# Patient Record
Sex: MALE | Race: BLACK OR AFRICAN AMERICAN | NOT HISPANIC OR LATINO | URBAN - METROPOLITAN AREA
[De-identification: names, ages, dates, MRNs, and addresses within clinical notes are randomized per-mention and may not be internally consistent; named-entity substitution may affect disease eponyms.]

---

## 2017-02-14 ENCOUNTER — OUTPATIENT (OUTPATIENT)
Dept: OUTPATIENT SERVICES | Facility: HOSPITAL | Age: 32
LOS: 1 days | Discharge: HOME | End: 2017-02-14

## 2017-06-27 DIAGNOSIS — K02.53 DENTAL CARIES ON PIT AND FISSURE SURFACE PENETRATING INTO PULP: ICD-10-CM

## 2017-09-17 ENCOUNTER — EMERGENCY (EMERGENCY)
Facility: HOSPITAL | Age: 32
LOS: 0 days | Discharge: HOME | End: 2017-09-17

## 2017-09-17 DIAGNOSIS — Z79.899 OTHER LONG TERM (CURRENT) DRUG THERAPY: ICD-10-CM

## 2017-09-17 DIAGNOSIS — M54.2 CERVICALGIA: ICD-10-CM
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
## 2017-09-17 DIAGNOSIS — S16.1XXA STRAIN OF MUSCLE, FASCIA AND TENDON AT NECK LEVEL, INITIAL ENCOUNTER: ICD-10-CM
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
## 2017-09-17 DIAGNOSIS — Z91.013 ALLERGY TO SEAFOOD: ICD-10-CM

## 2018-03-27 ENCOUNTER — EMERGENCY (EMERGENCY)
Facility: HOSPITAL | Age: 33
LOS: 0 days | Discharge: HOME | End: 2018-03-27

## 2018-03-27 VITALS
SYSTOLIC BLOOD PRESSURE: 115 MMHG | HEART RATE: 89 BPM | DIASTOLIC BLOOD PRESSURE: 69 MMHG | OXYGEN SATURATION: 97 % | RESPIRATION RATE: 16 BRPM | WEIGHT: 198.42 LBS | TEMPERATURE: 97 F

## 2018-03-27 VITALS — WEIGHT: 199.96 LBS

## 2018-03-27 DIAGNOSIS — H92.02 OTALGIA, LEFT EAR: ICD-10-CM

## 2018-03-27 DIAGNOSIS — H61.22 IMPACTED CERUMEN, LEFT EAR: ICD-10-CM

## 2018-03-27 NOTE — ED PROVIDER NOTE - PHYSICAL EXAMINATION
CONST: Well appearing in NAD  EYES: PERRL, EOMI, Sclera and conjunctiva clear. Vision 20/20  ENT: No nasal discharge. Cerumen impaction in L ear, small amount removed. Unable to visualize TM. Right TM clear. Oropharynx normal appearing, no erythema or exudates.   CARD: Normal S1 S2; Normal rate and rhythm  RESP: Equal BS B/L, No wheezes, rhonchi or rales. No distress

## 2018-03-27 NOTE — ED PROVIDER NOTE - NS ED ROS FT
CONST: No fever, chills or bodyaches  EYES: No pain, redness, drainage or visual changes.  ENT: +Decreased hearing in L ear; No ear pain or discharge, nasal discharge or congestion. No sore throat  CARD: No chest pain, palpitations  RESP: No SOB, cough, hemoptysis. No hx of asthma or COPD

## 2018-03-27 NOTE — ED PROCEDURE NOTE - GENERAL PROCEDURE DETAILS
Normal saline injected through 18 gauge tubing with little removal. Used small scoop to remove cerumen. Still unable to removal all cerumen.

## 2018-03-27 NOTE — ED PROVIDER NOTE - OBJECTIVE STATEMENT
Pt is a 31 yo M c/o muffled earing in L ear x 1 week. Pt denies pain in the ear, no sore throat, fever or chills. No injury to the ear.

## 2018-11-11 ENCOUNTER — EMERGENCY (EMERGENCY)
Facility: HOSPITAL | Age: 33
LOS: 0 days | Discharge: HOME | End: 2018-11-11
Attending: EMERGENCY MEDICINE | Admitting: EMERGENCY MEDICINE

## 2018-11-11 VITALS
SYSTOLIC BLOOD PRESSURE: 125 MMHG | HEART RATE: 83 BPM | DIASTOLIC BLOOD PRESSURE: 62 MMHG | OXYGEN SATURATION: 98 % | HEIGHT: 72 IN | WEIGHT: 205.91 LBS | TEMPERATURE: 98 F | RESPIRATION RATE: 20 BRPM

## 2018-11-11 DIAGNOSIS — R50.9 FEVER, UNSPECIFIED: ICD-10-CM

## 2018-11-11 DIAGNOSIS — B97.89 OTHER VIRAL AGENTS AS THE CAUSE OF DISEASES CLASSIFIED ELSEWHERE: ICD-10-CM

## 2018-11-11 DIAGNOSIS — J06.9 ACUTE UPPER RESPIRATORY INFECTION, UNSPECIFIED: ICD-10-CM

## 2018-11-11 RX ORDER — PSEUDOEPHEDRINE HCL 30 MG
1 TABLET ORAL
Qty: 16 | Refills: 0 | OUTPATIENT
Start: 2018-11-11 | End: 2018-11-14

## 2018-11-11 RX ORDER — DEXAMETHASONE 0.5 MG/5ML
10 ELIXIR ORAL ONCE
Qty: 0 | Refills: 0 | Status: COMPLETED | OUTPATIENT
Start: 2018-11-11 | End: 2018-11-11

## 2018-11-11 RX ADMIN — Medication 10 MILLIGRAM(S): at 04:42

## 2018-11-11 NOTE — ED PROVIDER NOTE - PROGRESS NOTE DETAILS
Patient seen and evaluated by me. Patient does not meet CENTOR criteria for strep pharyngitis. Seems based on H+P that this is more viral URI. Patient will be prescribed decongestant and anti-tussive medications and will follow up with PMD. Will give 10mg decadron IM as well for symptomatic relief prior to discharge.

## 2018-11-11 NOTE — ED PROVIDER NOTE - MEDICAL DECISION MAKING DETAILS
Patient presented with 3 days of URI symptoms. Otherwise well appearing, tolerates PO, ambulatory, HD stable. Gave 10mg decadron IM for symptomatic relief in ED. Patient to be prescribed decongestant and anti-tussive medication for symptomatic relief as well. Agrees to follow up with PMD.

## 2019-04-09 ENCOUNTER — EMERGENCY (EMERGENCY)
Facility: HOSPITAL | Age: 34
LOS: 0 days | Discharge: HOME | End: 2019-04-09
Admitting: PHYSICIAN ASSISTANT
Payer: MEDICAID

## 2019-04-09 VITALS
TEMPERATURE: 97 F | DIASTOLIC BLOOD PRESSURE: 58 MMHG | OXYGEN SATURATION: 98 % | RESPIRATION RATE: 16 BRPM | HEART RATE: 72 BPM | SYSTOLIC BLOOD PRESSURE: 95 MMHG

## 2019-04-09 VITALS — DIASTOLIC BLOOD PRESSURE: 69 MMHG | SYSTOLIC BLOOD PRESSURE: 103 MMHG

## 2019-04-09 DIAGNOSIS — F17.200 NICOTINE DEPENDENCE, UNSPECIFIED, UNCOMPLICATED: ICD-10-CM

## 2019-04-09 DIAGNOSIS — M54.9 DORSALGIA, UNSPECIFIED: ICD-10-CM

## 2019-04-09 DIAGNOSIS — M54.6 PAIN IN THORACIC SPINE: ICD-10-CM

## 2019-04-09 DIAGNOSIS — Z79.899 OTHER LONG TERM (CURRENT) DRUG THERAPY: ICD-10-CM

## 2019-04-09 DIAGNOSIS — M54.2 CERVICALGIA: ICD-10-CM

## 2019-04-09 DIAGNOSIS — Z91.013 ALLERGY TO SEAFOOD: ICD-10-CM

## 2019-04-09 LAB — D DIMER BLD IA.RAPID-MCNC: 128 NG/ML DDU — SIGNIFICANT CHANGE UP (ref 0–230)

## 2019-04-09 PROCEDURE — 99284 EMERGENCY DEPT VISIT MOD MDM: CPT

## 2019-04-09 PROCEDURE — 71046 X-RAY EXAM CHEST 2 VIEWS: CPT | Mod: 26

## 2019-04-09 RX ORDER — IBUPROFEN 200 MG
1 TABLET ORAL
Qty: 15 | Refills: 0 | OUTPATIENT
Start: 2019-04-09 | End: 2019-04-13

## 2019-04-09 RX ORDER — TIZANIDINE 4 MG/1
1 TABLET ORAL
Qty: 15 | Refills: 0 | OUTPATIENT
Start: 2019-04-09 | End: 2019-04-13

## 2019-04-09 RX ORDER — KETOROLAC TROMETHAMINE 30 MG/ML
30 SYRINGE (ML) INJECTION ONCE
Qty: 0 | Refills: 0 | Status: DISCONTINUED | OUTPATIENT
Start: 2019-04-09 | End: 2019-04-09

## 2019-04-09 RX ORDER — METHOCARBAMOL 500 MG/1
1000 TABLET, FILM COATED ORAL ONCE
Qty: 0 | Refills: 0 | Status: COMPLETED | OUTPATIENT
Start: 2019-04-09 | End: 2019-04-09

## 2019-04-09 RX ADMIN — Medication 30 MILLIGRAM(S): at 16:12

## 2019-04-09 RX ADMIN — METHOCARBAMOL 1000 MILLIGRAM(S): 500 TABLET, FILM COATED ORAL at 16:12

## 2019-04-09 NOTE — ED PROVIDER NOTE - CLINICAL SUMMARY MEDICAL DECISION MAKING FREE TEXT BOX
Pt with right back pain. Worse on inspiration and ROM of arm and back. CXR neg, Ddimer neg. Pt feels better after meds. Will dc home on NSAIDS and MRs. FU with med clinic.  I have discussed the discharge plan with the patient. The patient agrees with the plan, as discussed.  The patient understands Emergency Department diagnosis is a preliminary diagnosis often based on limited information and that the patient must adhere to the follow-up plan as discussed.  The patient understands that if the symptoms worsen or if prescribed medications do not have the desired/planned effect that the patient may return to the Emergency Department at any time for further evaluation and treatment.

## 2019-04-09 NOTE — ED PROVIDER NOTE - NS ED ROS FT
CONST: No fever, chills or bodyaches  EYES: No pain, redness, drainage or visual changes.  ENT: No ear pain or discharge, nasal discharge or congestion. No sore throat  CARD: No chest pain, palpitations  RESP: No SOB, cough, hemoptysis. No hx of asthma or COPD  GI: No abdominal pain, N/V/D  MS: No joint pain or extremity pain/injury  SKIN: No rashes  NEURO: No headache, dizziness, paresthesias or LOC

## 2019-04-09 NOTE — ED PROVIDER NOTE - OBJECTIVE STATEMENT
Pt has constant right mid back pain since yesterday. Pain is dull and worse with ROM and deep breaths. DEnies cough, fever, chills, SOB. Pt smokes. Denies calf pain or swelling.

## 2019-04-09 NOTE — ED PROVIDER NOTE - NSFOLLOWUPCLINICS_GEN_ALL_ED_FT
[facility]  Medicine  [address], [place]   Phone: [phone]  Fax:   Follow Up Time: 4-6 Days

## 2019-04-09 NOTE — ED PROVIDER NOTE - PHYSICAL EXAMINATION
CONST: Well appearing in NAD  EYES: PERRL, EOMI, Sclera and conjunctiva clear.   NECK: Non-tender, no meningeal signs  CARD: Normal S1 S2; Normal rate and rhythm  RESP: Equal BS B/L, No wheezes, rhonchi or rales. No distress  GI: Soft, non-tender, non-distended.  MS: Normal ROM in all extremities. No midline spinal tenderness. There is spasm and tenderness to the trapezius muscle of right back. No calf swelling or tenderness  SKIN: Warm, dry, no acute rashes. Good turgor  NEURO: A&Ox3, No focal deficits. Strength 5/5 with no sensory deficits. Steady gait

## 2019-07-10 NOTE — ED ADULT TRIAGE NOTE - MEANS OF ARRIVAL

## 2019-08-06 ENCOUNTER — EMERGENCY (EMERGENCY)
Facility: HOSPITAL | Age: 34
LOS: 0 days | Discharge: HOME | End: 2019-08-06
Attending: EMERGENCY MEDICINE | Admitting: EMERGENCY MEDICINE
Payer: MEDICAID

## 2019-08-06 VITALS
DIASTOLIC BLOOD PRESSURE: 59 MMHG | SYSTOLIC BLOOD PRESSURE: 106 MMHG | WEIGHT: 192.9 LBS | OXYGEN SATURATION: 99 % | HEART RATE: 63 BPM | TEMPERATURE: 96 F | RESPIRATION RATE: 18 BRPM

## 2019-08-06 DIAGNOSIS — F17.200 NICOTINE DEPENDENCE, UNSPECIFIED, UNCOMPLICATED: ICD-10-CM

## 2019-08-06 DIAGNOSIS — Z91.013 ALLERGY TO SEAFOOD: ICD-10-CM

## 2019-08-06 DIAGNOSIS — M54.9 DORSALGIA, UNSPECIFIED: ICD-10-CM
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
## 2019-08-06 DIAGNOSIS — Z79.899 OTHER LONG TERM (CURRENT) DRUG THERAPY: ICD-10-CM

## 2019-08-06 DIAGNOSIS — M54.5 LOW BACK PAIN: ICD-10-CM

## 2019-08-06 PROCEDURE — 99282 EMERGENCY DEPT VISIT SF MDM: CPT

## 2019-08-06 NOTE — ED ADULT NURSE NOTE - CHIEF COMPLAINT QUOTE
"I hurt my back on Friday after a slip and fall, I'm still having back pain". pt was seen at [facility] after fall on Friday

## 2019-08-06 NOTE — ED PROVIDER NOTE - PHYSICAL EXAMINATION
CONSTITUTIONAL: Well-developed; well-nourished; in no acute distress.   SKIN: warm, dry.  CARD: S1, S2 normal; no murmurs, gallops, or rubs. Regular rate and rhythm.   RESP: No wheezes, rales or rhonchi.  EXT: Normal ROM.  No clubbing, cyanosis or edema. Tenderness to palpation of the B/L lower lumbar region.  LYMPH: No acute cervical adenopathy.  NEURO: Alert, oriented. Normal gait. Sensation grossly intact.  PSYCH: Cooperative, appropriate.

## 2019-08-06 NOTE — ED PROVIDER NOTE - CLINICAL SUMMARY MEDICAL DECISION MAKING FREE TEXT BOX
Back pain- no midline tenderness, no foal neuro deficit elicited on exam. - declines pain control, ambulatory, ortho/rehab follow up.

## 2019-08-06 NOTE — ED ADULT NURSE NOTE - CHPI ED NUR SYMPTOMS NEG
no difficulty bearing weight/no motor function loss/no numbness/no bladder dysfunction/no anorexia/no constipation/no neck tenderness/no tingling/no fatigue/no bowel dysfunction

## 2019-08-06 NOTE — ED ADULT NURSE NOTE - NSIMPLEMENTINTERV_GEN_ALL_ED
Implemented All Universal Safety Interventions:  [place] to call system. Call bell, personal items and telephone within reach. Instruct patient to call for assistance. Room bathroom lighting operational. Non-slip footwear when patient is off stretcher. Physically safe environment: no spills, clutter or unnecessary equipment. Stretcher in lowest position, wheels locked, appropriate side rails in place.

## 2019-08-06 NOTE — ED PROVIDER NOTE - NSFOLLOWUPCLINICS_GEN_ALL_ED_FT
[facility]  Medicine  [address], [place]   Phone: [phone]  Fax:   Follow Up Time: 1-3 Days

## 2019-08-06 NOTE — ED ADULT TRIAGE NOTE - CHIEF COMPLAINT QUOTE
"I hurt my back on Friday after a slip and fall, I'm still having back pain". pt was seen at [facility] after fall on Friday

## 2019-08-06 NOTE — ED PROVIDER NOTE - OBJECTIVE STATEMENT
33y M w/ no sig PMH presents with back pain for 4 days. States originally slipped on water and fell backwards on back. Was evaluated in ED and was discharged on muscle relaxants. States gets good relief with muscle relaxant but returns today as pain has persisted. Does not want to be reliant on any medications. Has outpatient follow up wit his primary MD tomorrow but is coming in today to rule out any emergent conditions. Denies fever, chills, n/v/d, abd pain, urinary/bowel incontinence, or numbness/tingling.

## 2021-07-18 ENCOUNTER — TRANSCRIPTION ENCOUNTER (OUTPATIENT)
Age: 36
End: 2021-07-18

## 2023-11-27 ENCOUNTER — EMERGENCY (EMERGENCY)
Facility: HOSPITAL | Age: 38
LOS: 0 days | Discharge: HOME | End: 2023-11-27
Attending: EMERGENCY MEDICINE
Payer: SELF-PAY

## 2023-11-27 VITALS
TEMPERATURE: 98 F | WEIGHT: 190.04 LBS | SYSTOLIC BLOOD PRESSURE: 107 MMHG | OXYGEN SATURATION: 99 % | HEART RATE: 69 BPM | RESPIRATION RATE: 18 BRPM | DIASTOLIC BLOOD PRESSURE: 61 MMHG

## 2023-11-27 DIAGNOSIS — Z91.013 ALLERGY TO SEAFOOD: ICD-10-CM

## 2023-11-27 DIAGNOSIS — B02.9 ZOSTER WITHOUT COMPLICATIONS: ICD-10-CM

## 2023-11-27 DIAGNOSIS — R21 RASH AND OTHER NONSPECIFIC SKIN ERUPTION: ICD-10-CM

## 2023-11-27 DIAGNOSIS — Z86.19 PERSONAL HISTORY OF OTHER INFECTIOUS AND PARASITIC DISEASES: ICD-10-CM

## 2023-11-27 PROCEDURE — 99284 EMERGENCY DEPT VISIT MOD MDM: CPT

## 2023-11-27 PROCEDURE — 99283 EMERGENCY DEPT VISIT LOW MDM: CPT

## 2023-11-27 RX ORDER — VALACYCLOVIR 500 MG/1
1 TABLET, FILM COATED ORAL
Qty: 21 | Refills: 0
Start: 2023-11-27 | End: 2023-12-03

## 2023-11-27 NOTE — ED PROVIDER NOTE - PATIENT PORTAL LINK FT
You can access the FollowMyHealth Patient Portal offered by [facility] by registering at the following website: [email or web address]. By joining [facility]’s FollowMyHealth portal, you will also be able to view your health information using other applications (apps) compatible with our system.

## 2023-11-27 NOTE — ED PROVIDER NOTE - CLINICAL SUMMARY MEDICAL DECISION MAKING FREE TEXT BOX
Patient presents with rash x 2 day. On exam noted to have vesicular lesions concerning for shingles rash. antiviral and steroids given. Discharged with pmd and derm follow up. return precautions discussed.

## 2023-11-27 NOTE — ED PROVIDER NOTE - NSFOLLOWUPINSTRUCTIONS_ED_ALL_ED_FT
Please follow-up with PCP in 1 to 3 days. Please take meds as prescribed. Return precautions explained in full to patient/family.    Our Emergency Department Referral Coordinators will be reaching out to you in the next 24-48 hours from 9:00am to 5:00pm with a follow up appointment. Please expect a phone call from the hospital in that time frame. If you do not receive a call or if you have any questions or concerns, you can reach them at   [phone].    Shingles  A rash on the skin.  Shingles, which is also known as herpes zoster, is an infection that causes a painful skin rash and fluid-filled blisters. It is caused by a virus.    Shingles only develops in people who:  Have had chickenpox.  Have been vaccinated against chickenpox. Shingles is rare in this group.  What are the causes?  Shingles is caused by varicella-zoster virus. This is the same virus that causes chickenpox. After a person is exposed to the virus, it stays in the body in an inactive (dormant) state. Shingles develops if the virus is reactivated. This can happen many years after the first (initial) exposure to the virus. It is not known what causes this virus to be reactivated.    What increases the risk?  People who have had chickenpox or received the chickenpox vaccine are at risk for shingles. Shingles infection is more common in people who:  Are older than 60 years of age.  Have a weakened disease-fighting system (immune system), such as people with:  HIV (human immunodeficiency virus).  AIDS (acquired immunodeficiency syndrome).  Cancer.  Are taking medicines that weaken the immune system, such as organ transplant medicines.  Are experiencing a lot of stress.  What are the signs or symptoms?  Early symptoms of this condition include itching, tingling, and pain in an area on your skin. Pain may be described as burning, stabbing, or throbbing.    A few days or weeks after early symptoms start, a painful red rash appears. The rash is usually on one side of the body and has a band-like or belt-like pattern. The rash eventually turns into fluid-filled blisters that break open, change into scabs, and dry up in about 2–3 weeks.    At any time during the infection, you may also develop:  A fever.  Chills.  A headache.  Nausea.  How is this diagnosed?  This condition is diagnosed with a skin exam. Skin or fluid samples (a culture) may be taken from the blisters before a diagnosis is made.    How is this treated?  The rash may last for several weeks. There is not a specific cure for this condition. Your health care provider may prescribe medicines to help you manage pain, recover more quickly, and avoid long-term problems. Medicines may include:  Antiviral medicines.  Anti-inflammatory medicines.  Pain medicines.  Anti-itching medicines (antihistamines).  If the area involved is on your face, you may be referred to a specialist, such as an eye doctor (ophthalmologist) or an ear, nose, and throat (ENT) doctor (otorhinolaryngologist) to help you avoid eye problems, chronic pain, or disability.    Follow these instructions at home:  Medicines    Take over-the-counter and prescription medicines only as told by your health care provider.  Apply an anti-itch cream or numbing cream to the affected area as told by your health care provider.  Relieving itching and discomfort    A bathtub filled with water.  Apply cold, wet cloths (cold compresses) to the area of the rash or blisters as told by your health care provider.  Cool baths can be soothing. Try adding baking soda or dry oatmeal to the water to reduce itching. Do not bathe in hot water.  Use calamine lotion as recommended by your health care provider. This is an over-the-counter lotion that helps to relieve itchiness.  Blister and rash care    Keep your rash covered with a loose bandage (dressing). Wear loose-fitting clothing to help ease the pain of material rubbing against the rash.  Wash your hands with soap and water for at least 20 seconds before and after you change your dressing. If soap and water are not available, use hand .  Change your dressing as told by your health care provider.  Keep your rash and blisters clean by washing the area with mild soap and cool water as told by your health care provider.  Check your rash every day for signs of infection. Check for:  More redness, swelling, or pain.  Fluid or blood.  Warmth.  Pus or a bad smell.  Do not scratch your rash or pick at your blisters. To help avoid scratching:  Keep your fingernails clean and cut short.  Wear gloves or mittens while you sleep, if scratching is a problem.  General instructions    Rest as told by your health care provider.  Wash your hands often with soap and water for at least 20 seconds. If soap and water are not available, use hand . Doing this lowers your chance of getting a bacterial skin infection.  Before your blisters change into scabs, your shingles infection can cause chickenpox in people who have never had it or have never been vaccinated against it. To prevent this from happening, avoid contact with other people, especially:  Babies.  Pregnant women.  Children who have eczema.  Older people who have transplants.  People who have chronic illnesses, such as cancer or AIDS.  Keep all follow-up visits. This is important.  How is this prevented?  Getting vaccinated is the best way to prevent shingles and protect against shingles complications. If you have not been vaccinated, talk with your health care provider about getting the vaccine.    Where to find more information  Centers for Disease Control and Prevention: www.cdc.gov  Contact a health care provider if:  Your pain is not relieved with prescribed medicines.  Your pain does not get better after the rash heals.  You have any of these signs of infection:  More redness, swelling, or pain around the rash.  Fluid or blood coming from the rash.  Warmth coming from your rash.  Pus or a bad smell coming from the rash.  A fever.  Get help right away if:  The rash is on your face or nose.  You have facial pain, pain around your eye area, or loss of feeling on one side of your face.  You have difficulty seeing.  You have ear pain or have ringing in your ear.  You have a loss of taste.  Your condition gets worse.  Summary  Shingles, also known as herpes zoster, is an infection that causes a painful skin rash and fluid-filled blisters.  This condition is diagnosed with a skin exam. Skin or fluid samples (a culture) may be taken from the blisters.  Keep your rash covered with a loose bandage (dressing). Wear loose-fitting clothing to help ease the pain of material rubbing against the rash.  Before your blisters change into scabs, your shingles infection can cause chickenpox in people who have never had it or have never been vaccinated against it.  This information is not intended to replace advice given to you by your health care provider. Make sure you discuss any questions you have with your health care provider.

## 2023-11-27 NOTE — ED PROVIDER NOTE - PHYSICAL EXAMINATION
CONSTITUTIONAL: NAD  SKIN: Grouped vesicles, coalescing, about 2 cm in diameter noted on left buttock.  No surrounding fluctuance or erythema appreciated.  Mildly tender.  HEAD: NCAT  EYES: NL inspection  NEURO: Grossly unremarkable  PSYCH: Cooperative, appropriate.

## 2023-11-27 NOTE — ED PROVIDER NOTE - ATTENDING CONTRIBUTION TO CARE
37 yo M no pmh presents with rash to back of left leg. States that few days ago noted some pain to that area then yesterday looked and noted a rash. no fevers or recent illness. patient states that he had chicken pox twice as a child and reports one previous episode of shingles a few years ago.     CONSTITUTIONAL: Well-developed; well-nourished; in no acute distress.   SKIN: warm, dry. vesicular rash with erythematous base to posterior left thigh. no drainage.   HEAD: Normocephalic; atraumatic.  EXT: Normal ROM.  5/5 strength in all 4 extremities   LYMPH: No acute cervical adenopathy.  NEURO: Alert, oriented, grossly unremarkable. neurovascularly intact

## 2023-11-27 NOTE — ED ADULT NURSE NOTE - NSFALLUNIVINTERV_ED_ALL_ED
Bed/Stretcher in lowest position, wheels locked, appropriate side rails in place/Call bell, personal items and telephone in reach/Instruct patient to call for assistance before getting out of bed/chair/stretcher/Non-slip footwear applied when patient is off stretcher/[place] to call system/Physically safe environment - no spills, clutter or unnecessary equipment/Purposeful proactive rounding/Room/bathroom lighting operational, light cord in reach

## 2023-11-27 NOTE — ED PROVIDER NOTE - OBJECTIVE STATEMENT
38-year-old male no notable past medical history coming with complaints of rash.  Patient reports that 5 days ago he felt some pain on his left buttocks, felt similar to a sensation of an ingrowing hair.  He ignored it until he noticed yesterday a small rash on that area.  Mostly painful.  Denies any concerning sexual history, monogamous with 1 person.  Denies any other associated symptoms at this time.  Notes that he had chickenpox as a child, unknown vaccination. 38-year-old male no notable past medical history coming with complaints of rash.  Patient reports that 5 days ago he felt some pain on his left buttocks, felt similar to a sensation of an ingrowing hair.  He ignored it until he noticed yesterday a small rash on that area.  Mostly painful.  Denies any concerning sexual history, monogamous with 1 person.  Denies any other associated symptoms at this time.  Notes that he had chickenpox x2 and an episode of shingles on his neck years ago, unknown vaccination.

## 2023-11-29 NOTE — CHART NOTE - NSCHARTNOTEFT_GEN_A_CORE
[facility] [id number] / Sent to MAP chat 11/28 - ROMEL / Appointment made - ROMEL / Mynor Barber    Specialty: dermatology  Date: January 4, 2024  Time: 1PM  Location: [facility] [address]

## 2024-01-03 ENCOUNTER — NON-APPOINTMENT (OUTPATIENT)
Age: 39
End: 2024-01-03

## 2024-01-04 ENCOUNTER — APPOINTMENT (OUTPATIENT)
Dept: DERMATOLOGY | Facility: CLINIC | Age: 39
End: 2024-01-04

## 2024-01-04 PROBLEM — Z00.00 ENCOUNTER FOR PREVENTIVE HEALTH EXAMINATION: Status: ACTIVE | Noted: 2024-01-04

## 2025-05-22 ENCOUNTER — EMERGENCY (EMERGENCY)
Facility: HOSPITAL | Age: 40
LOS: 0 days | Discharge: HOME | End: 2025-05-22
Attending: EMERGENCY MEDICINE
Payer: MEDICAID

## 2025-05-22 VITALS
HEART RATE: 69 BPM | RESPIRATION RATE: 17 BRPM | WEIGHT: 190.04 LBS | DIASTOLIC BLOOD PRESSURE: 66 MMHG | SYSTOLIC BLOOD PRESSURE: 110 MMHG | TEMPERATURE: 98 F | OXYGEN SATURATION: 99 %

## 2025-05-22 DIAGNOSIS — L02.215 CUTANEOUS ABSCESS OF PERINEUM: ICD-10-CM

## 2025-05-22 DIAGNOSIS — R10.2 PELVIC AND PERINEAL PAIN: ICD-10-CM

## 2025-05-22 PROCEDURE — 99283 EMERGENCY DEPT VISIT LOW MDM: CPT | Mod: 25

## 2025-05-22 PROCEDURE — 82962 GLUCOSE BLOOD TEST: CPT

## 2025-05-22 PROCEDURE — 46050 I&D PERIANAL ABSCESS SUPFC: CPT

## 2025-05-22 PROCEDURE — 99284 EMERGENCY DEPT VISIT MOD MDM: CPT

## 2025-05-22 RX ORDER — AMOXICILLIN 500 MG/1
1 CAPSULE ORAL
Qty: 10 | Refills: 0
Start: 2025-05-22 | End: 2025-05-26

## 2025-05-22 NOTE — ED ADULT NURSE NOTE - CAS DISCH TRANSFER METHOD
Patient had appt in sept w/ stephanie. Cancelled appt and cancellation notes stated got care somewhere else. MD MELENDEZ patient no longer going through here for endo care.    Private car

## 2025-05-22 NOTE — ED PROVIDER NOTE - PHYSICAL EXAMINATION
VITAL SIGNS: I have reviewed nursing notes and confirm.  CONSTITUTIONAL: Well-developed; well-nourished; in no acute distress.  SKIN: Skin exam is warm and dry, no acute rash.  HEAD: Normocephalic; atraumatic.  EYES: PERRL, EOM intact; conjunctiva and sclera clear.  ENT: No nasal discharge; airway clear. TMs clear.  NECK: Supple; non tender.  CARD: S1, S2 normal; no murmurs, gallops, or rubs. Regular rate and rhythm.  RESP: Normal respiratory effort, no tachypnea or distress. Lungs CTAB, no wheezes, rales or rhonchi.  ABD: soft, NT/ND.  :  Perineum with 2 cm area of induration, tenderness and fluctuance. no overlying erythema. no swelling to cheir-anal area. No crepitus in the perineum.  EXT: Normal ROM. No clubbing, cyanosis or edema.  LYMPH: No acute cervical adenopathy.  NEURO: Alert, oriented. Grossly unremarkable. No focal deficits.  PSYCH: Cooperative, appropriate.

## 2025-05-22 NOTE — ED PROVIDER NOTE - OBJECTIVE STATEMENT
39-year-old male no significant past medical history presenting for eval of 2 days of perineal pain and swelling.  Patient endorses he had fever and chills for 3 days prior to noticing the swelling. Patient was seen at [facility] 2 days ago and reports he had a CT pelvis with no abscess was found and nothing drained..  Patient was started on 7 days of doxycycline, currently taking as prescribed.  Patient presents today with worsening pain and swelling to the perineum.  No fever or chills for the past 2 days.  No nausea, vomiting.  Normal urination.  Normal BMs.  No abdominal pain.  No back pain.  No history of diabetes.

## 2025-05-22 NOTE — ED ADULT NURSE NOTE - NSFALLUNIVINTERV_ED_ALL_ED
Bed/Stretcher in lowest position, wheels locked, appropriate side rails in place/Call bell, personal items and telephone in reach/Instruct patient to call for assistance before getting out of bed/chair/stretcher/Non-slip footwear applied when patient is off stretcher/[place] to call system/Physically safe environment - no spills, clutter or unnecessary equipment/Purposeful proactive rounding/Room/bathroom lighting operational, light cord in reach

## 2025-05-22 NOTE — ED PROVIDER NOTE - NSFOLLOWUPINSTRUCTIONS_ED_ALL_ED_FT
Continue doxycycline as discussed and take amoxacillin as directed. Return to ED if symptoms worsen or fever begins.        Abscess    WHAT YOU NEED TO KNOW:    What is an abscess? An abscess is an area under the skin where pus (infected fluid) collects. An abscess is often caused by bacteria, fungi or other germs that get into an open wound. You can get an abscess anywhere on your body.  Skin Abscess    What increases my risk for an abscess?    An animal bite    A foreign object lodged under your skin    Heavy or frequent sweating    A health problem, such as diabetes or obesity    Injecting illegal drugs  What are the signs and symptoms of an abscess? You may have a swollen mass that is red and painful. Pus may leak out of the mass. The pus will be white or yellow and may smell bad. You may have redness and pain days before the mass appears. You may have a fever and chills if the infection spreads.    How is an abscess diagnosed? Your healthcare provider will examine the area. He or she will check to see if your abscess is draining. A sample of fluid from your abscess may show what is causing your infection.    How is an abscess treated?    Incision and drainage is a procedure used to remove pus and fluid from the abscess. Your healthcare provider will make a cut in the abscess so it can drain. Then gauze will be put into the wound and it will be covered with a bandage.    Surgery may be needed to remove your abscess. Your healthcare provider may do this if the abscess is on your hands or buttocks. Surgery can decrease the risk that the abscess will come back.  What can I do to care for myself?    Apply a warm compress to your abscess. This will help it open and drain. Wet a washcloth in warm, but not hot, water. Apply the compress for 10 minutes. Repeat this 4 times each day. Do not press on an abscess or try to open it with a needle. You may push the bacteria deeper or into your blood.    Do not share your clothes, towels, or sheets with anyone. This can spread the infection to others.    Wash your hands often. This can help prevent the spread of germs. Use soap and water or an alcohol-based hand rub.  Handwashing  What can I do to care for my wound after it is drained?    Care for your wound as directed. If your healthcare provider says it is okay, carefully remove the bandage and gauze packing. You may need to soak the gauze to get it out of your wound. Clean your wound and the area around it as directed. Dry the area and put on new, clean bandages. Change your bandages when they get wet or dirty.    Ask your healthcare provider how to change the gauze in your wound. Keep track of how many pieces of gauze are placed inside the wound. Do not put too much packing in the wound. Do not pack the gauze too tightly in your wound.  When should I seek immediate care?    The area around your abscess becomes very painful, warm, or has red streaks.    You have a fever and chills.    Your heart is beating faster than usual.    You feel faint or confused.  When should I call my doctor?    Your abscess gets bigger.    Your abscess returns.    You have questions or concerns about your condition or care.

## 2025-05-22 NOTE — ED PROVIDER NOTE - PATIENT PORTAL LINK FT
You can access the FollowMyHealth Patient Portal offered by [facility] by registering at the following website: [email or web address]. By joining [employer]’s FollowMyHealth portal, you will also be able to view your health information using other applications (apps) compatible with our system.

## 2025-05-22 NOTE — ED PROVIDER NOTE - ATTENDING CONTRIBUTION TO CARE
39-year-old male no significant past medical history complaining of 2 days of perineal pain and swelling.  Patient reports he had fever and chills for 3 days prior to that.  Patient was seen at [facility] 2 days ago and reports he had a CT pelvis with no abscess.  Patient was started on 7 days of doxycycline.  Patient presents today with worsening pain and swelling to the perineum.  No fever or chills for the past 2 days.  No nausea, vomiting.  Normal urination.  Normal BMs.  No abdominal pain.  No back pain.  No history of diabetes.  CONSTITUTIONAL: Well-appearing; well-nourished; in no apparent distress.   HEAD: Normocephalic; atraumatic.   EYES: PERRL; EOM intact. Conjunctiva normal B/L.   ENT: Normal pharynx with no tonsillar hypertrophy. MMM.  NECK: Supple; non-tender; no cervical lymphadenopathy.   CHEST: Normal chest excursion with respiration.   CARDIOVASCULAR: Normal S1, S2; no murmurs, rubs, or gallops.   RESPIRATORY: Normal chest excursion with respiration; breath sounds clear and equal bilaterally; no wheezes, rhonchi, or rales.  GI/: Normal bowel sounds; non-distended; non-tender. Perineum with 2 cm area of induration, tenderness and fluctuance.  Positive pointed abscess.  Purulent discharge expressed.  No crepitus in the perineum.  No erythema.  Scrotum and penis normal.  Perirectal area normal.

## 2025-05-22 NOTE — ED ADULT TRIAGE NOTE - CHIEF COMPLAINT QUOTE
Pt here reports possible abbess near buttocks. Reports was seen at [facility] had CT done and given abx. denies fevers

## 2025-05-22 NOTE — ED ADULT NURSE NOTE - CHIEF COMPLAINT QUOTE
Pt here reports possible abbess near buttocks. Reports was seen at [facility] had CT done and given abx. denies fevers

## 2025-05-22 NOTE — ED PROVIDER NOTE - CLINICAL SUMMARY MEDICAL DECISION MAKING FREE TEXT BOX
39-year-old male with perineal abscess.  I&D performed with return of moderate amount of pus.  Patient instructed to continue doxycycline and started on Augmentin in addition.  Patient instructed on wound care.  Patient given strict return instructions and discharged to follow-up with PMD.